# Patient Record
Sex: FEMALE | Race: BLACK OR AFRICAN AMERICAN | URBAN - METROPOLITAN AREA
[De-identification: names, ages, dates, MRNs, and addresses within clinical notes are randomized per-mention and may not be internally consistent; named-entity substitution may affect disease eponyms.]

---

## 2017-01-12 ENCOUNTER — APPOINTMENT (RX ONLY)
Dept: URBAN - METROPOLITAN AREA CLINIC 50 | Facility: CLINIC | Age: 62
Setting detail: DERMATOLOGY
End: 2017-01-12

## 2017-01-12 DIAGNOSIS — L85.3 XEROSIS CUTIS: ICD-10-CM

## 2017-01-12 PROBLEM — Z85.3 PERSONAL HISTORY OF MALIGNANT NEOPLASM OF BREAST: Status: ACTIVE | Noted: 2017-01-12

## 2017-01-12 PROBLEM — Z92.3 PERSONAL HISTORY OF IRRADIATION: Status: ACTIVE | Noted: 2017-01-12

## 2017-01-12 PROBLEM — E78.5 HYPERLIPIDEMIA, UNSPECIFIED: Status: ACTIVE | Noted: 2017-01-12

## 2017-01-12 PROBLEM — L30.9 DERMATITIS, UNSPECIFIED: Status: ACTIVE | Noted: 2017-01-12

## 2017-01-12 PROBLEM — I10 ESSENTIAL (PRIMARY) HYPERTENSION: Status: ACTIVE | Noted: 2017-01-12

## 2017-01-12 PROCEDURE — ? PRESCRIPTION

## 2017-01-12 PROCEDURE — ? COUNSELING

## 2017-01-12 PROCEDURE — ? RECOMMENDATIONS

## 2017-01-12 PROCEDURE — 99201: CPT

## 2017-01-12 RX ORDER — FLUOCINONIDE 0.5 MG/ML
THIN LAYER OINTMENT TOPICAL BID
Qty: 1 | Refills: 0 | Status: ERX | COMMUNITY
Start: 2017-01-12

## 2017-01-12 RX ADMIN — FLUOCINONIDE THIN LAYER: 0.5 OINTMENT TOPICAL at 00:00

## 2017-01-12 ASSESSMENT — LOCATION SIMPLE DESCRIPTION DERM: LOCATION SIMPLE: RIGHT PRETIBIAL REGION

## 2017-01-12 ASSESSMENT — LOCATION DETAILED DESCRIPTION DERM: LOCATION DETAILED: RIGHT LATERAL DISTAL PRETIBIAL REGION

## 2017-01-12 ASSESSMENT — LOCATION ZONE DERM: LOCATION ZONE: LEG

## 2017-01-12 NOTE — PROCEDURE: RECOMMENDATIONS
Detail Level: Zone
Recommendation Preamble: If you have any unanswered questions or new concerns we want you to let us know. Please call us if you have any questions.\\n\\nFor more information about your diagnosis we recommend the following resources:\\n\\[email or web address] \"Topics A-Z\"\\n\\[email or web address] \"For the Public: Dermatology A-Z\"\\n\\[email or web address] \"Disease Database\"\\n

## 2017-02-13 ENCOUNTER — APPOINTMENT (RX ONLY)
Dept: URBAN - METROPOLITAN AREA CLINIC 50 | Facility: CLINIC | Age: 62
Setting detail: DERMATOLOGY
End: 2017-02-13

## 2017-02-13 DIAGNOSIS — L30.8 OTHER SPECIFIED DERMATITIS: ICD-10-CM | Status: RESOLVING

## 2017-02-13 PROCEDURE — ? TREATMENT REGIMEN

## 2017-02-13 PROCEDURE — 99212 OFFICE O/P EST SF 10 MIN: CPT

## 2017-05-08 ENCOUNTER — APPOINTMENT (RX ONLY)
Dept: URBAN - METROPOLITAN AREA CLINIC 50 | Facility: CLINIC | Age: 62
Setting detail: DERMATOLOGY
End: 2017-05-08

## 2017-05-08 DIAGNOSIS — L82.1 OTHER SEBORRHEIC KERATOSIS: ICD-10-CM

## 2017-05-08 PROCEDURE — 99213 OFFICE O/P EST LOW 20 MIN: CPT

## 2017-05-08 ASSESSMENT — LOCATION DETAILED DESCRIPTION DERM
LOCATION DETAILED: RIGHT LATERAL TEMPLE
LOCATION DETAILED: LEFT POSTAURICULAR CREASE

## 2017-05-08 ASSESSMENT — LOCATION ZONE DERM
LOCATION ZONE: EAR
LOCATION ZONE: FACE

## 2017-05-08 ASSESSMENT — LOCATION SIMPLE DESCRIPTION DERM
LOCATION SIMPLE: RIGHT TEMPLE
LOCATION SIMPLE: LEFT EAR

## 2018-04-10 ENCOUNTER — APPOINTMENT (RX ONLY)
Dept: URBAN - METROPOLITAN AREA CLINIC 50 | Facility: CLINIC | Age: 63
Setting detail: DERMATOLOGY
End: 2018-04-10

## 2018-04-10 DIAGNOSIS — L30.8 OTHER SPECIFIED DERMATITIS: ICD-10-CM | Status: IMPROVED

## 2018-04-10 PROBLEM — L30.9 DERMATITIS, UNSPECIFIED: Status: ACTIVE | Noted: 2018-04-10

## 2018-04-10 PROCEDURE — ? PRESCRIPTION

## 2018-04-10 PROCEDURE — ? RECOMMENDATIONS

## 2018-04-10 PROCEDURE — 99213 OFFICE O/P EST LOW 20 MIN: CPT

## 2018-04-10 PROCEDURE — ? COUNSELING

## 2018-04-10 RX ORDER — CLOBETASOL PROPIONATE 0.5 MG/G
THIN LAYER OINTMENT TOPICAL BID
Qty: 1 | Refills: 1 | Status: ERX | COMMUNITY
Start: 2018-04-10

## 2018-04-10 RX ADMIN — CLOBETASOL PROPIONATE THIN LAYER: 0.5 OINTMENT TOPICAL at 21:42

## 2018-04-10 ASSESSMENT — LOCATION SIMPLE DESCRIPTION DERM: LOCATION SIMPLE: RIGHT ANKLE

## 2018-04-10 ASSESSMENT — LOCATION DETAILED DESCRIPTION DERM: LOCATION DETAILED: RIGHT LATERAL POSTERIOR ANKLE

## 2018-04-10 ASSESSMENT — LOCATION ZONE DERM: LOCATION ZONE: LEG

## 2018-04-10 NOTE — PROCEDURE: RECOMMENDATIONS
Recommendation Preamble: Please follow up if your condition fails to resolve or worsens or for skin growths that are enlarging, fail to heal, change shape/color or that are bleeding.\\n\\nIf you have any unanswered questions or new concerns we want you to let us know. Please call us if you have any questions.\\n\\nFor more information about your diagnosis we recommend the following resources:\\n\\[email or web address] \"Topics A-Z\"\\n\\[email or web address] \"For [email or web address] Public: Dermatology A-Z\"\\n\\[email or web address] \"Disease Database\"